# Patient Record
Sex: MALE | Race: WHITE
[De-identification: names, ages, dates, MRNs, and addresses within clinical notes are randomized per-mention and may not be internally consistent; named-entity substitution may affect disease eponyms.]

---

## 2019-04-23 ENCOUNTER — HOSPITAL ENCOUNTER (EMERGENCY)
Dept: HOSPITAL 25 - ED | Age: 69
Discharge: HOME | End: 2019-04-23
Payer: MEDICARE

## 2019-04-23 VITALS — SYSTOLIC BLOOD PRESSURE: 124 MMHG | DIASTOLIC BLOOD PRESSURE: 52 MMHG
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
## 2019-04-23 DIAGNOSIS — S96.911A: Primary | ICD-10-CM
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
## 2019-04-23 PROCEDURE — 99282 EMERGENCY DEPT VISIT SF MDM: CPT

## 2019-04-23 NOTE — ED
Lower Extremity





- HPI Summary


HPI Summary: 


Patient is a 68-year-old male presenting to the ED after falling from a ladder 

approximately 3 feet up.  He denies any pain except for the right lateral foot.

  He denies any pain to the ankle.  He has been ambulatory on this foot and 

stated he went golfing immediately following and was able to walk.  However as 

he was walking, the pain began to worsen.  He denies any ecchymosis or swelling 

to the area.  Pain is most notably is to the dorsum and lateral side of the 

foot without pain to the calcaneus or ankle.  He has never injured the area 

before.  He remains infiltrate, however with pain.  He has not taken any 

ibuprofen or Tylenol for relief.  He states ice has been helping.








- History of Current Complaint


Chief Complaint: EDExtremityLower


Stated Complaint: RIGHT FOOT INJURY PER PT


Time Seen by Provider: 04/23/19 14:20


Hx Obtained From: Patient


Mechanism Of Injury: Twisted


Onset of Pain: Hours


Onset/Duration: Hours


Severity Initially: Moderate


Severity Currently: Moderate


Pain Intensity: 6


Pain Scale Used: 0-10 Numeric


Timing: Constant


Location: Is Discrete @ - right lateral foot pain


Character Of Pain: Aching


Aggravating Factor(s): Standing, Ambulation


Alleviating Factor(s): Rest


Able to Bear Weight: No





- Risk Factors


Gout Risk Factors: Negative


DVT Risk Factors: Negative


Septic Arthritis Risk Factor: Negative





- Allergies/Home Medications


Allergies/Adverse Reactions: 


 Allergies











Allergy/AdvReac Type Severity Reaction Status Date / Time


 


Tetanus Vaccines and Toxoid Allergy  Unknown Verified 04/23/19 14:15





   Reaction  





   Details  














PMH/Surg Hx/FS Hx/Imm Hx


Previously Healthy: Yes





- Immunization History


Hx Pertussis Vaccination: No


Immunizations Up to Date: Yes


Infectious Disease History: No


Infectious Disease History: 


   Denies: Traveled Outside the [place] in Last 30 Days





- Social History


Occupation: Unemployed


Lives: With Family


Alcohol Use: Occasionally


Hx Substance Use: No


Substance Use Type: Reports: None


Hx Tobacco Use: No


Smoking Status (MU): Never Smoked Tobacco





Review of Systems


Constitutional: Negative


Negative: Fever, Chills, Fatigue, Skin Diaphoresis


Negative: Palpitations, Chest Pain


Negative: Shortness Of Breath, Cough


Genitourinary: Negative


Positive: no symptoms reported, see HPI


Positive: Arthralgia, Myalgia


Skin: Negative - right lateral foot injury


All Other Systems Reviewed And Are Negative: Yes





Physical Exam


Triage Information Reviewed: Yes


Vital Signs On Initial Exam: 


 Initial Vitals











Temp Pulse Resp BP Pulse Ox


 


 100.3 F   101   20   124/91   93 


 


 04/23/19 14:12  04/23/19 14:12  04/23/19 14:12  04/23/19 14:12  04/23/19 14:12











Vital Signs Reviewed: Yes


Appearance: Positive: Well-Appearing, Well-Nourished


Skin: Positive: Warm, Skin Color Reflects Adequate Perfusion


Head/Face: Positive: Normal Head/Face Inspection


Eyes: Positive: EOMI, Conjunctiva Clear


Neck: Positive: Supple


Respiratory/Lung Sounds: Positive: Clear to Auscultation, Breath Sounds Present


Cardiovascular: Positive: Pulses are Symmetrical in both Upper and Lower 

Extremities


Musculoskeletal: Positive: Pain @ - right lateral foot pain


Neurological: Positive: Sensory/Motor Intact, Speech Normal


Psychiatric: Positive: Affect/Mood Appropriate





Diagnostics





- Vital Signs


 Vital Signs











  Temp Pulse Resp BP Pulse Ox


 


 04/23/19 14:12  100.3 F  101  20  124/91  93














- Laboratory


Lab Statement: Any lab studies that have been ordered have been reviewed, and 

results considered in the medical decision making process.





Lower Extremity Course/Dx





- Course


Course Of Treatment: Patient denies any back pain, hip pain, knee pain or ankle 

pain bilaterally from his 3 foot fall from a ladder.  He states after falling 

from a ladder he was able to go golfing however the right lateral side of his 

foot continued to be in pain.  X-ray obtained which shows no acute osseous 

injury.  Patient is able to plantarflex and dorsiflex the ankle without 

discomfort.  He is having discomfort to palpation over the dorsum and lateral 

sides over the deltoid ligament.  He is able to relate, however he states he is 

only able to ambulate a few feet and is requesting crutches.  Crutches are 

given and he is encouraged ice, ibuprofen, elevation and he is given an 

orthopedic follow-up.





- Diagnoses


Differential Diagnosis/HQI/PQRI: Positive: Sprain, Strain


Provider Diagnoses: 


 Right foot strain








Discharge





- Sign-Out/Discharge


Documenting (check all that apply): Patient Departure


Patient Received Moderate/Deep Sedation with Procedure: No





- Discharge Plan


Condition: Stable


Disposition: HOME


Patient Education Materials:  Foot Sprain (ED)


Referrals: 


Galo Mart MD [Medical Doctor] - 


No Primary Care Phys,NOPCP [Primary Care Provider] - 


Additional Instructions: 


Ibuprofen 600 mg 3 times daily


Follow-up with orthopedics if symptoms persist


Ice and elevation and rest will help











- Billing Disposition and Condition


Condition: STABLE


Disposition: Home